# Patient Record
Sex: MALE | Race: WHITE
[De-identification: names, ages, dates, MRNs, and addresses within clinical notes are randomized per-mention and may not be internally consistent; named-entity substitution may affect disease eponyms.]

---

## 2016-04-29 VITALS — SYSTOLIC BLOOD PRESSURE: 104 MMHG | DIASTOLIC BLOOD PRESSURE: 68 MMHG

## 2020-06-03 ENCOUNTER — HOSPITAL ENCOUNTER (OUTPATIENT)
Dept: HOSPITAL 63 - LAB | Age: 70
Discharge: HOME | End: 2020-06-03
Attending: NURSE ANESTHETIST, CERTIFIED REGISTERED
Payer: MEDICARE

## 2020-06-03 DIAGNOSIS — Z11.59: Primary | ICD-10-CM

## 2020-06-05 ENCOUNTER — HOSPITAL ENCOUNTER (OUTPATIENT)
Dept: HOSPITAL 63 - SURG | Age: 70
End: 2020-06-05
Attending: INTERNAL MEDICINE
Payer: MEDICARE

## 2020-06-05 VITALS
SYSTOLIC BLOOD PRESSURE: 116 MMHG | SYSTOLIC BLOOD PRESSURE: 116 MMHG | DIASTOLIC BLOOD PRESSURE: 71 MMHG | DIASTOLIC BLOOD PRESSURE: 71 MMHG | SYSTOLIC BLOOD PRESSURE: 116 MMHG | DIASTOLIC BLOOD PRESSURE: 71 MMHG

## 2020-06-05 DIAGNOSIS — Z96.651: ICD-10-CM

## 2020-06-05 DIAGNOSIS — Z86.010: ICD-10-CM

## 2020-06-05 DIAGNOSIS — Z85.6: ICD-10-CM

## 2020-06-05 DIAGNOSIS — Z96.641: ICD-10-CM

## 2020-06-05 DIAGNOSIS — K21.9: ICD-10-CM

## 2020-06-05 DIAGNOSIS — Z95.5: ICD-10-CM

## 2020-06-05 DIAGNOSIS — F41.9: ICD-10-CM

## 2020-06-05 DIAGNOSIS — Z87.442: ICD-10-CM

## 2020-06-05 DIAGNOSIS — K57.30: ICD-10-CM

## 2020-06-05 DIAGNOSIS — F32.9: ICD-10-CM

## 2020-06-05 DIAGNOSIS — K62.5: Primary | ICD-10-CM

## 2020-06-05 DIAGNOSIS — K63.89: ICD-10-CM

## 2020-06-05 DIAGNOSIS — K55.21: ICD-10-CM

## 2020-06-05 DIAGNOSIS — Z85.46: ICD-10-CM

## 2020-06-05 PROCEDURE — 45378 DIAGNOSTIC COLONOSCOPY: CPT

## 2020-08-13 ENCOUNTER — HOSPITAL ENCOUNTER (OUTPATIENT)
Dept: HOSPITAL 63 - DXRAD | Age: 70
Discharge: HOME | End: 2020-08-13
Attending: FAMILY MEDICINE
Payer: MEDICARE

## 2020-08-13 DIAGNOSIS — M16.0: Primary | ICD-10-CM

## 2020-08-13 DIAGNOSIS — M25.751: ICD-10-CM

## 2020-08-13 PROCEDURE — 73502 X-RAY EXAM HIP UNI 2-3 VIEWS: CPT

## 2020-08-13 NOTE — RAD
Pelvis and right hip 3 views

 

INDICATION: Pain in right hip

 

COMPARISON: Abdomen pelvis CT without IV contrast of 9/3/2015.

 

FINDINGS:

 

Intact pelvic ring. Bilateral hips show osteophytic spurring on the 

femoral heads and medial joint space narrowing. No fracture or aggressive 

osseous lesions. Incidental additional markers in the prostate gland. The 

additional views of the right hip show osteophytes on the femoral head and

irregularity to the articular surface in the acetabulum. No fracture or 

dislocation is evident. No sclerotic bone lesions seen.

 

IMPRESSION:

Bilateral hip degenerative changes with no fracture, malalignment or 

aggressive appearing bony lesions.

 

Electronically signed by: Elli Whittington MD (8/13/2020 4:50 PM) 

[id number]

## 2020-09-18 ENCOUNTER — HOSPITAL ENCOUNTER (OUTPATIENT)
Dept: HOSPITAL 63 - DXRAD | Age: 70
Discharge: HOME | End: 2020-09-18
Attending: FAMILY MEDICINE
Payer: MEDICARE

## 2020-09-18 DIAGNOSIS — M47.816: ICD-10-CM

## 2020-09-18 DIAGNOSIS — M61.9: ICD-10-CM

## 2020-09-18 DIAGNOSIS — M16.0: Primary | ICD-10-CM

## 2020-09-18 PROCEDURE — 73502 X-RAY EXAM HIP UNI 2-3 VIEWS: CPT

## 2020-09-18 PROCEDURE — 72110 X-RAY EXAM L-2 SPINE 4/>VWS: CPT

## 2020-09-18 NOTE — RAD
EXAM: Pelvis and right hip, 3 views.

 

HISTORY: Pain.

 

COMPARISON: None.

 

FINDINGS: A frontal view of the pelvis and 2 views the right hip are 

obtained. There is bilateral hip joint space narrowing with subchondral 

sclerosis and marginal femoral head spurring. There are fiducials within 

the prostate. There is degenerative change at the lower lumbar levels.

 

IMPRESSION: 

1. Moderate bilateral hip osteoarthritis.

2. No acute osseous finding.

 

Electronically signed by: Magdalena Wyatt MD (9/18/2020 3:14 PM) [email or web address]

## 2020-09-18 NOTE — RAD
EXAM: Lumbar spine, 5 views.

 

HISTORY: Pain.

 

COMPARISON: None.

 

FINDINGS: 5 views of the lumbar spine are obtained. There is no listhesis.

The vertebral bodies are normal in height. There is degenerative endplate 

remodeling and facet arthropathy at all levels. There is no fracture. 

There is lateral hip osteoarthritis.

 

IMPRESSION: 

1. Multilevel degenerative change involving the lumbar spine.

2. Bilateral hip osteoarthritis.

 

Electronically signed by: Magdalena Wyatt MD (9/18/2020 3:13 PM) [email or web address]

## 2020-10-07 ENCOUNTER — HOSPITAL ENCOUNTER (OUTPATIENT)
Dept: HOSPITAL 61 - PNCL | Age: 70
End: 2020-10-07
Attending: ANESTHESIOLOGY
Payer: MEDICARE

## 2020-10-07 DIAGNOSIS — Z80.42: ICD-10-CM

## 2020-10-07 DIAGNOSIS — Z96.651: ICD-10-CM

## 2020-10-07 DIAGNOSIS — Z88.8: ICD-10-CM

## 2020-10-07 DIAGNOSIS — M25.551: Primary | ICD-10-CM

## 2020-10-07 DIAGNOSIS — Z79.82: ICD-10-CM

## 2020-10-07 DIAGNOSIS — M54.5: ICD-10-CM

## 2020-10-07 DIAGNOSIS — M19.90: ICD-10-CM

## 2020-10-07 DIAGNOSIS — Z85.6: ICD-10-CM

## 2020-10-07 DIAGNOSIS — I25.10: ICD-10-CM

## 2020-10-07 DIAGNOSIS — Z85.89: ICD-10-CM

## 2020-10-07 DIAGNOSIS — Z80.3: ICD-10-CM

## 2020-10-07 DIAGNOSIS — Z79.899: ICD-10-CM

## 2020-10-07 PROCEDURE — 77002 NEEDLE LOCALIZATION BY XRAY: CPT

## 2020-10-07 PROCEDURE — 20610 DRAIN/INJ JOINT/BURSA W/O US: CPT

## 2020-10-07 NOTE — PDOC1
INITIAL PAIN CONSULT


DATE OF SERVICE:


DOS:


DATE: 10/7/20 


TIME: 09:17





CHIEF COMPLAINT:


Chief Complaint:


Low back and right hip pain





HISTORY OF PRESENT ILLNESS:


70-year-old male presents history of pain for 3 to 4 years right hip and low 

back worse with walking standing changing positions after a knee surgery he had 

on the right side about 3 to 4 years ago patient reports pain is now worse with 

walking standing socially putting all his weight on his right leg with radiating

pain from the posterior gluteus around the lateral thigh anterior groin and 

medial upper thigh he reports is much worse with climbing stairs putting all his

weight on his right leg left leg is essentially pain-free but only very mild 

discomfort now and then.  Patient has had physical therapy also is doing 

exercise currently and is currently under physical therapy as well taking 

ibuprofen daily which does help by about 20% patient reports stretching 

strengthening on his own is been helpful as well patient reports it wakes him 

from sleep but infrequently and usually takes ibuprofen before sleep to help him

get through the night patient reports is not effective bowel bladder control 

does affect is ability to walk after long distances like more than 2 miles he 

does feel the pain and some fatigability in the right leg patient rates his 

disability rating 0-10 10 being the worst is a 0 family home responsibilities 

social activity and life support activities 3 with recreation and occupation and

to with sexual behavior and self-care activities.  Patient did have plain films 

of the hips as well as low back showing some degenerative changes in the lumbar 

spine also moderate bilateral hip osteoarthritis.  Patient reports no loss of 

motor function but significant fatigability and pain in the right hip with 

walking





PAST MEDICAL HISTORY:


PMH:


Arthritis, leukemia, prostate cancer status post chemotherapy and radiation 

treatment, coronary artery disease status post stent placement





PREVIOUS SURGERIES:


Past Surgical Hx:


Right knee replacement 2016, left shoulder surgery 2006 and knee surgeries x2 on

the right prior to replacement





CURRENT MEDICATIONS:


Current Meds:





Active Scripts








 Medications  Dose


 Route/Sig


 Max Daily Dose Days Date Category


 


 Flomax


  (Tamsulosin Hcl)


 0.4 Mg Cap.er.24h  1 Cap


 PO DAILY


   10/7/20 Reported


 


 Aspir-Low


  (Aspirin) 81 Mg


 Tablet.dr  81 Mg


 PO DAILY


   10/17/18 Reported


 


 Crestor


  (Rosuvastatin


 Calcium) 20 Mg


 Tablet  20 Mg


 PO 2X WEEKLY


   10/17/18 Reported


 


 Zetia (Ezetimibe)


 10 Mg Tablet  10 Mg


 PO DAILY


   10/17/18 Reported


 


 Vitamin B-12


  (Cyanocobalamin


  (Vitamin B-12))


 1,000 Mcg Tablet  1,000 Mcg


 PO DAILY


   8/15/16 Reported


 


 Xanax


  (Alprazolam) 0.25


 Mg Tablet  0.25 Mg


 PO PRN Q6HRS


   1/8/16 Reported











ALLERGIES;


Allergies:  


Coded Allergies:  


     ciprofloxacin (Verified  Adverse Reaction, Intermediate, Nausea, 10/7/15)





FAMILY HISTORY:


Family Hx:


Cancer breast and prostate, arthritis, heart disease





SOCIAL HISTORY:


Social Hx:


Patient drinks alcohol very rarely does not smoke not use any illegal illicit 

recreational drugs  lives with his spouse lives in [facility].





REVIEW OF SYSTEMS:


ROS:


Positive for those items mentioned in history of present illness, all systems 

are reviewed, otherwise negative, is complete full and well-documented on 

patient's chart





PHYSICAL EXAM:


VS:


Blood pressure is 110/76 pulse 76 respirations 18 temperature 97.8 F height is 

6 foot weight is 193 pounds


PE:


PHYSICAL EXAMINATION:





GENERAL: The patient is awake, alert, oriented, appropriate, very pleasant 

demeanor


HEENT: Shows normocephalic, atraumatic.  Extraocular movements are intact and 

symmetrical.  Patient wearing eyeglasses.  Oral cavity: Mucous membranes moist 

and pink.  Dentition is intact.


NECK: Shows anterior throat supple without palpable lymphadenopathy noted.  

Swallow reflex symmetrical.


CHEST: Shows normal on inspection.  Breath sounds are clear bilaterally, no 

rales rhonchi wheezes auscultated.


HEART: Shows S1, S2 clear.  No murmurs auscultated.


ABDOMEN: Soft, nontender, nondistended.  No palpable organomegaly is noted.  No 

rebound or guarding demonstrated.


BACK: Shows spine grossly in the midline.  Normal-appearing cervical lordotic 

curvature.  There is slightly increased thoracic kyphosis, some minor flattening

of the lumbar lordotic curvature.  Lumbar paraspinous muscles show symmetrical 

on inspection, on palpation shows some moderate tenderness diffusely throughout 

the upper, middle and lower distribution of the paraspinous muscles bilaterally 

without specific trigger points, without radiation of pain.  The patient has 

good rotational motion of the lumbar spine, both laterally as well as extension 

and flexion without significant difficulty.  No tenderness over the spinous 

processes, sacrum or sacroiliac regions.


EXTREMITIES: Lower extremities show deep tendon reflexes 2+ in the patellar and 

tendo calcaneus tendons.  Motor exam is 5 on a scale of 5 with right 

dorsiflexion, extension, quadriceps and hamstring flexion and 5/5 on the left.  

Peripheral pulses are 1+ posterior tibial.  No peripheral edema is noted 

bilaterally.  Lower extremities are warm and dry to touch, equal in color and 

appearance.  Straight leg raise noted to be negative bilaterally. Pedro's 

maneuvers are positive on the right with external rotation of the right hip and 

posterior displacement and negative on the left.  The patient is able to stand, 

stand on his toes without significant difficulty or loss of balance walks with a

normal-appearing gait for short distance in the office today not using any 

assistive devices to ambulate.


SKIN: Shows warm and dry, good turgor.  No edema.  No sores, rashes or bruising 

throughout.





IMPRESSION:


Impression:


70-year-old male with 3 to 4-year history increasing pain right hip radiating to

the right lower extremity as noted


Plain films bilateral hips as noted


History of leukemia and prostate cancer


History of arthritis





Plan: Options were discussed with the patient including conservative medical 

management physical therapies interventional techniques he like to pursue 

interventional techniques.  We discussed a right intra-articular hip joint 

injection using description as well as anatomical models to describe the 

procedure.  Risk rhythm discussed including but not limited to bleeding 

infection possibility of intravascular injection sequelae spread local 

anesthetic numbness side effects steroid medication exposure fluoroscopy and 

portals regarding pain control.  Patient understands wished to proceed.  Patient

return to clinic in possibly 1 month for follow-up was counseled as to return 

appointment activity level and side effects to be aware of.








Under sterile prep and drape patient in supine position using C-arm fluoroscopic

guidance patient's right hip was visualized and using 1% lidocaine topically 

anesthetized in the lateral approach using a 22-gauge 5 inch Quincke needle with

stylette the hip that was entered under direct fluoroscopic visualization 

without difficulty stylet was removed at this time 2 cc of contrast was injected

with good spread within the hip joint without extravasation or washout.  At this

time 0.25% bupivacaine 3 cc and 80 mg Depo-Medrol was then injected into the 

joint, needle was removed sterile bandage was applied.  Patient tolerated 

procedure well had no complications.











MJ COLLINS MD                Oct 7, 2020 09:24